# Patient Record
Sex: FEMALE | Race: WHITE | Employment: PART TIME | URBAN - METROPOLITAN AREA
[De-identification: names, ages, dates, MRNs, and addresses within clinical notes are randomized per-mention and may not be internally consistent; named-entity substitution may affect disease eponyms.]

---

## 2018-06-08 ENCOUNTER — HOSPITAL ENCOUNTER (EMERGENCY)
Facility: CLINIC | Age: 23
Discharge: HOME | End: 2018-06-08
Attending: NURSE PRACTITIONER | Admitting: NURSE PRACTITIONER
Payer: OTHER MISCELLANEOUS

## 2018-06-08 VITALS
DIASTOLIC BLOOD PRESSURE: 87 MMHG | TEMPERATURE: 98.5 F | RESPIRATION RATE: 20 BRPM | OXYGEN SATURATION: 95 % | SYSTOLIC BLOOD PRESSURE: 120 MMHG | HEART RATE: 103 BPM | WEIGHT: 95 LBS

## 2018-06-08 PROCEDURE — 25000128 H RX IP 250 OP 636: Performed by: NURSE PRACTITIONER

## 2018-06-08 PROCEDURE — G0463 HOSPITAL OUTPT CLINIC VISIT: HCPCS | Mod: 25 | Performed by: NURSE PRACTITIONER

## 2018-06-08 PROCEDURE — 90471 IMMUNIZATION ADMIN: CPT | Performed by: NURSE PRACTITIONER

## 2018-06-08 PROCEDURE — 36415 COLL VENOUS BLD VENIPUNCTURE: CPT | Performed by: NURSE PRACTITIONER

## 2018-06-08 PROCEDURE — 87522 HEPATITIS C REVRS TRNSCRPJ: CPT | Performed by: NURSE PRACTITIONER

## 2018-06-08 PROCEDURE — 87340 HEPATITIS B SURFACE AG IA: CPT | Performed by: NURSE PRACTITIONER

## 2018-06-08 PROCEDURE — 99213 OFFICE O/P EST LOW 20 MIN: CPT | Mod: Z6 | Performed by: NURSE PRACTITIONER

## 2018-06-08 PROCEDURE — 86706 HEP B SURFACE ANTIBODY: CPT | Performed by: NURSE PRACTITIONER

## 2018-06-08 PROCEDURE — 86704 HEP B CORE ANTIBODY TOTAL: CPT | Performed by: NURSE PRACTITIONER

## 2018-06-08 PROCEDURE — 86803 HEPATITIS C AB TEST: CPT | Performed by: NURSE PRACTITIONER

## 2018-06-08 PROCEDURE — 87389 HIV-1 AG W/HIV-1&-2 AB AG IA: CPT | Performed by: NURSE PRACTITIONER

## 2018-06-08 PROCEDURE — 90715 TDAP VACCINE 7 YRS/> IM: CPT | Performed by: NURSE PRACTITIONER

## 2018-06-08 RX ADMIN — CLOSTRIDIUM TETANI TOXOID ANTIGEN (FORMALDEHYDE INACTIVATED), CORYNEBACTERIUM DIPHTHERIAE TOXOID ANTIGEN (FORMALDEHYDE INACTIVATED), BORDETELLA PERTUSSIS TOXOID ANTIGEN (GLUTARALDEHYDE INACTIVATED), BORDETELLA PERTUSSIS FILAMENTOUS HEMAGGLUTININ ANTIGEN (FORMALDEHYDE INACTIVATED), BORDETELLA PERTUSSIS PERTACTIN ANTIGEN, AND BORDETELLA PERTUSSIS FIMBRIAE 2/3 ANTIGEN 0.5 ML: 5; 2; 2.5; 5; 3; 5 INJECTION, SUSPENSION INTRAMUSCULAR at 18:38

## 2018-06-08 NOTE — ED PROVIDER NOTES
History     Chief Complaint   Patient presents with     Body Fluid Exposure     stuck with  needle.  unknown patients needle.  stuck at [employer]      HPI  Khushboo Herman is a 23 year old female who presents to urgent care for evaluation of a needle skin to her right index finger.  This occurred at her work, the [employer].  Patient states there was a bag left in the bin at the store and she went to  the bag and was stuck by a needle that was in the bag.  Patient states there was not any blood from the stick noted, but she did feel a poke. Tetanus is not UTD.    Problem List:    There are no active problems to display for this patient.       Past Medical History:    No past medical history on file.    Past Surgical History:    No past surgical history on file.    Family History:    No family history on file.    Social History:  Marital Status:  Single [1]  Social History   Substance Use Topics     Smoking status: Not on file     Smokeless tobacco: Not on file     Alcohol use Not on file        Medications:      ClonazePAM (KLONOPIN PO)   MIRTAZAPINE PO       Review of Systems  No fever.  No bleeding.  Patient otherwise feels well.    Physical Exam   BP: 120/87  Pulse: 103  Temp: 98.5  F (36.9  C)  Resp: 20  Weight: 43.1 kg (95 lb)  SpO2: 95 %      Physical Exam   Constitutional: She appears well-developed and well-nourished. No distress.   HENT:   Head: Normocephalic and atraumatic.   Eyes: Conjunctivae are normal.   Cardiovascular: Normal rate.    Pulmonary/Chest: Effort normal.   Neurological: She is alert.   Skin:   Right index finger, no visualized puncture wound. No bleeding.       ED Course     ED Course     Procedures               No results found for this or any previous visit (from the past 24 hour(s)).    Medications   Tdap (tetanus-diphtheria-acell pertussis) (ADACEL) injection 0.5 mL (not administered)       Assessments & Plan (with Medical Decision Making)   Patient was counseled on  exposure risk including HIV, hepatitis B, and hepatitis C.  Patient consented to screening labs.  Labs were drawn and sent.  Patient provided with exposure information packet.  Patient's tetanus was updated today.  Patient was provided written instruction on when to follow-up for next lab testing (6 weeks, 3 months, and 6 months) through her PCP.    I have reviewed the nursing notes.    I have reviewed the findings, diagnosis, plan and need for follow up with the patient.      New Prescriptions    No medications on file       Final diagnoses:   Needlestick injury of finger, initial encounter       6/8/2018   [facility] EMERGENCY DEPARTMENT     Leana Morrow APRN CNP  06/08/18 8320

## 2018-06-08 NOTE — ED AVS SNAPSHOT
[facility] Emergency Department    [address] [address]    Phone:  [phone]    Fax:  [phone]                                       Khushboo Herman   MRN: [id number]    Department:  [facility] Emergency Department   Date of Visit:  6/8/2018           After Visit Summary Signature Page     I have received my discharge instructions, and my questions have been answered. I have discussed any challenges I see with this plan with the nurse or doctor.    ..........................................................................................................................................  Patient/Patient Representative Signature      ..........................................................................................................................................  Patient Representative Print Name and Relationship to Patient    ..................................................               ................................................  Date                                            Time    ..........................................................................................................................................  Reviewed by Signature/Title    ...................................................              ..............................................  Date                                                            Time

## 2018-06-08 NOTE — ED AVS SNAPSHOT
[facility] Emergency Department    [address] [address]    Phone:  [phone]    Fax:  [phone]                                       Khushboo Herman   MRN: [id number]    Department:  [facility] Emergency Department   Date of Visit:  6/8/2018           Patient Information     Date Of Birth          1995        Your diagnoses for this visit were:     Needlestick injury of finger, initial encounter        You were seen by Leana Morrow APRN CNP.      Follow-up Information     Follow up with your regular doctor In 6 weeks.    Why:  follow-up lab tests.        Discharge Instructions       Here is the recommended follow-up for further screening labs:                     24 Hour Appointment Hotline       To make an appointment at any [facility], call [phone] ([phone]). If you don't have a family doctor or clinic, we will help you find one. [facility] clinics are conveniently located to serve the needs of you and your family.          ED Discharge Orders     HIV Antigen Antibody Combo - Baseline       Exposure Screening. Baseline. Instructions: Lab to draw 1 extra tube and hold for HIV 1 RNA quant if antigen/antibody combo positive.            Hepatitis B Surface Antibody - Baseline       Exposure Screening. Baseline.            Hepatitis B core antibody - Baseline       Exposure Screening. Baseline.            Hepatitis B surface antigen - Baseline       Exposure Screening. Baseline.            Hepatitis C RNA quantitative - Baseline       Exposure Screening. Baseline.            Hepatitis C antibody - Baseline       Exposure Screening. Baseline.                     Review of your medicines      Our records show that you are taking the medicines listed below. If these are incorrect, please call your family doctor or clinic.        Dose / Directions Last dose taken    KLONOPIN PO        Refills:  0        MIRTAZAPINE PO        Take by mouth At Bedtime  "  Refills:  0                Orders Needing Specimen Collection     None      Pending Results     Date and Time Order Name Status Description    6/8/2018 1813 HEPATITIS B SURFACE ANTIGEN In process     6/8/2018 1813 HEPATITIS B SURFACE ANTIBODY In process     6/8/2018 1813 HEPATITIS B CORE ANTIBODY In process     6/8/2018 1813 HIV ANTIGEN ANTIBODY COMBO In process     6/8/2018 1813 HEPATITIS C RNA QUANTITATIVE In process     6/8/2018 1813 HEPATITIS C ANTIBODY In process             Pending Culture Results     No orders found from 6/6/2018 to 6/9/2018.            Pending Results Instructions     If you had any lab results that were not finalized at the time of your Discharge, you can call the ED Lab Result RN at [phone]. You will be contacted by this team for any positive Lab results or changes in treatment. The nurses are available 7 days a week from 10A to 6:30P.  You can leave a message 24 hours per day and they will return your call.        Test Results From Your Hospital Stay        6/8/2018  6:29 PM         6/8/2018  6:29 PM         6/8/2018  6:29 PM         6/8/2018  6:29 PM         6/8/2018  6:29 PM         6/8/2018  6:29 PM                Thank you for choosing [facility]       Thank you for choosing [facility] for your care. Our goal is always to provide you with excellent care. Hearing back from our patients is one way we can continue to improve our services. Please take a few minutes to complete the written survey that you may receive in the mail after you visit with us. Thank you!        [employer] Information     [employer] lets you send messages to your doctor, view your test results, renew your prescriptions, schedule appointments and more. To sign up, go to [email or web address] . Click on \"Log in\" on the left side of the screen, which will take you to the Welcome page. Then click on \"Sign up Now\" on the right side of the page.     You will be asked to enter the access code listed below, as well as " some personal information. Please follow the directions to create your username and password.     Your access code is: [email or web address]  Expires: 2018  6:22 PM     Your access code will  in 90 days. If you need help or a new code, please call your [facility] clinic or [phone].        Care EveryWhere ID     This is your Care EveryWhere ID. This could be used by other organizations to access your [facility] medical records  [id number]        Equal Access to Services     BENJAMIN MANCERA : Hadii rica betancourto Sojonathan, waaxda luqadaha, qaybta kaalmada adecorrina, kiera kan . So [facility] [phone].    ATENCIÓN: Si habla español, tiene a ballard disposición servicios gratuitos de asistencia lingüística. Llame al [phone].    We comply with applicable federal civil rights laws and [place] laws. We do not discriminate on the basis of race, color, national origin, age, disability, sex, sexual orientation, or gender identity.            After Visit Summary       This is your record. Keep this with you and show to your community pharmacist(s) and doctor(s) at your next visit.

## 2018-06-11 ENCOUNTER — TELEPHONE (OUTPATIENT)
Dept: EMERGENCY MEDICINE | Facility: CLINIC | Age: 23
End: 2018-06-11

## 2018-06-11 LAB
HBV CORE AB SERPL QL IA: NONREACTIVE
HBV SURFACE AB SERPL IA-ACNC: 61.66 M[IU]/ML
HBV SURFACE AG SERPL QL IA: NONREACTIVE
HCV AB SERPL QL IA: NONREACTIVE
HCV RNA SERPL NAA+PROBE-ACNC: NORMAL [IU]/ML
HCV RNA SERPL NAA+PROBE-LOG IU: NORMAL LOG IU/ML
HIV 1+2 AB+HIV1 P24 AG SERPL QL IA: NONREACTIVE

## 2018-06-11 NOTE — TELEPHONE ENCOUNTER
[facility] Emergency Department Lab result notification:    [place] ED lab result protocol used  Blood and Body Fluid Exposure protocol    Reason for call  Notify of lab results, assess symptoms,  review ED providers recommendations/discharge instructions (if necessary) and advise per ED lab result f/u protocol    Lab Result  The following blood and bodily fluid lab results from a recent exposure were all negative (nonreactive) for:     Hepatitis C antibody     Hepatitis C RNA quantitative    Hepatitis B surface antigen (agn)    Hepatitis B Core antibody    HIV Antigen Antibody Combo.     The following blood and body fluid lab result from a recent exposure were Positive (reactive) for:    Hepatitis B surface antibody (rose) [Note: If Hepatitis B surface ANTIBODY (rose) is reactive/positive, this indicates Patient has immunity]    Patient to be notified of result and advised per [facility] ED lab result protocol  Information table from ED Provider visit on 06/08/2018  Symptoms reported at ED visit (Chief complaint, HPI)  a 23 year old female who presents to urgent care for evaluation of a needle skin to her right index finger.  This occurred at her work, the [employer].  Patient states there was a bag left in the bin at the store and she went to  the bag and was stuck by a needle that was in the bag.  Patient states there was not any blood from the stick noted, but she did feel a poke. Tetanus is not UTD.   ED providers Impression and Plan (applicable information) Patient was counseled on exposure risk including HIV, hepatitis B, and hepatitis C.  Patient consented to screening labs.  Labs were drawn and sent.  Patient provided with exposure information packet.  Patient's tetanus was updated today.  Patient was provided written instruction on when to follow-up for next lab testing (6 weeks, 3 months, and 6 months) through her PCP.     RN Assessment (Patient s current Symptoms), include time called.   [Insert Left message here if message left]  At 1320 Left voicemail message requesting a call back to [phone] between 10 a.m. and 6:00 p.m., 7 days a week.    Mary Solano, RN  [facility] RN  Lung Nodule and ED Lab Result F/u RN  Epic pool (ED late result f/u RN): [id number]  [facility] INCIDENTAL RADIOLOGY F/U NURSES: [id number]  Ph# [phone]